# Patient Record
Sex: MALE | URBAN - NONMETROPOLITAN AREA
[De-identification: names, ages, dates, MRNs, and addresses within clinical notes are randomized per-mention and may not be internally consistent; named-entity substitution may affect disease eponyms.]

---

## 2024-05-10 ENCOUNTER — APPOINTMENT (RX ONLY)
Dept: URBAN - NONMETROPOLITAN AREA CLINIC 13 | Facility: CLINIC | Age: 29
Setting detail: DERMATOLOGY
End: 2024-05-10

## 2024-05-10 DIAGNOSIS — Z41.9 ENCOUNTER FOR PROCEDURE FOR PURPOSES OTHER THAN REMEDYING HEALTH STATE, UNSPECIFIED: ICD-10-CM

## 2024-05-10 PROCEDURE — ? COSMETIC CONSULTATION: GENERAL

## 2024-05-31 ENCOUNTER — APPOINTMENT (RX ONLY)
Dept: URBAN - NONMETROPOLITAN AREA CLINIC 13 | Facility: CLINIC | Age: 29
Setting detail: DERMATOLOGY
End: 2024-05-31

## 2024-05-31 DIAGNOSIS — Z41.9 ENCOUNTER FOR PROCEDURE FOR PURPOSES OTHER THAN REMEDYING HEALTH STATE, UNSPECIFIED: ICD-10-CM

## 2024-05-31 PROCEDURE — ? MICRONEEDLING

## 2024-05-31 NOTE — PROCEDURE: MICRONEEDLING
# Of Treatments In Package: 0
Location #3: upper cheeks
Infusions (Optional): growth factor
Price (Use Numbers Only, No Special Characters Or $): 692
Location #4: lower face (pt had a beard)
Depth In Mm (Location #5): 0.25
Depth In Mm (Location #4): 1.5
Depth In Mm (Location #1): 0.5
Location #5: nose
Post-Care Instructions: PT was given physical copy of micro needling post care. After the procedure, take precautions against sun exposure. Do not apply sunscreen for 12 hours after the procedure. Do not apply make-up for 12 hours after the procedure. Avoid alcohol based toners for 10-14 days. After 2-3 days patients can return to their regular skin regimen.
Location #2: under eyes
Length Of Topical Anesthesia Application (Optional): 15 minutes
Location #1: forehead
Topical Anesthesia?: 23% lidocaine, 7% tetracaine
Additional Info: Pt has acne scarring on his cheeks and a scar on the left side of his nose. Told pt to shave before his next appointment.
Depth In Mm (Location #3): 1
Detail Level: Zone
Consent: Written consent obtained, risks reviewed including but not limited to pain, scarring, infection and incomplete improvement.  Patient understands the procedure is cosmetic in nature and will require out of pocket payment.